# Patient Record
Sex: FEMALE | Race: BLACK OR AFRICAN AMERICAN | NOT HISPANIC OR LATINO | URBAN - METROPOLITAN AREA
[De-identification: names, ages, dates, MRNs, and addresses within clinical notes are randomized per-mention and may not be internally consistent; named-entity substitution may affect disease eponyms.]

---

## 2019-07-16 ENCOUNTER — EMERGENCY (EMERGENCY)
Facility: HOSPITAL | Age: 54
LOS: 1 days | Discharge: HOME | End: 2019-07-16
Attending: EMERGENCY MEDICINE | Admitting: EMERGENCY MEDICINE
Payer: COMMERCIAL

## 2019-07-16 VITALS
TEMPERATURE: 98 F | OXYGEN SATURATION: 97 % | RESPIRATION RATE: 18 BRPM | DIASTOLIC BLOOD PRESSURE: 90 MMHG | SYSTOLIC BLOOD PRESSURE: 137 MMHG | HEART RATE: 76 BPM

## 2019-07-16 VITALS
RESPIRATION RATE: 16 BRPM | DIASTOLIC BLOOD PRESSURE: 88 MMHG | HEART RATE: 95 BPM | TEMPERATURE: 98 F | OXYGEN SATURATION: 95 % | SYSTOLIC BLOOD PRESSURE: 134 MMHG

## 2019-07-16 LAB
ALBUMIN SERPL ELPH-MCNC: 4.4 G/DL — SIGNIFICANT CHANGE UP (ref 3.3–5)
ALP SERPL-CCNC: 81 U/L — SIGNIFICANT CHANGE UP (ref 40–120)
ALT FLD-CCNC: 17 U/L — SIGNIFICANT CHANGE UP (ref 4–33)
ANION GAP SERPL CALC-SCNC: 13 MMO/L — SIGNIFICANT CHANGE UP (ref 7–14)
AST SERPL-CCNC: 17 U/L — SIGNIFICANT CHANGE UP (ref 4–32)
BASOPHILS # BLD AUTO: 0.02 K/UL — SIGNIFICANT CHANGE UP (ref 0–0.2)
BASOPHILS NFR BLD AUTO: 0.3 % — SIGNIFICANT CHANGE UP (ref 0–2)
BILIRUB SERPL-MCNC: 0.4 MG/DL — SIGNIFICANT CHANGE UP (ref 0.2–1.2)
BUN SERPL-MCNC: 14 MG/DL — SIGNIFICANT CHANGE UP (ref 7–23)
CALCIUM SERPL-MCNC: 10 MG/DL — SIGNIFICANT CHANGE UP (ref 8.4–10.5)
CHLORIDE SERPL-SCNC: 106 MMOL/L — SIGNIFICANT CHANGE UP (ref 98–107)
CO2 SERPL-SCNC: 23 MMOL/L — SIGNIFICANT CHANGE UP (ref 22–31)
CREAT SERPL-MCNC: 0.65 MG/DL — SIGNIFICANT CHANGE UP (ref 0.5–1.3)
EOSINOPHIL # BLD AUTO: 0.09 K/UL — SIGNIFICANT CHANGE UP (ref 0–0.5)
EOSINOPHIL NFR BLD AUTO: 1.3 % — SIGNIFICANT CHANGE UP (ref 0–6)
GLUCOSE SERPL-MCNC: 150 MG/DL — HIGH (ref 70–99)
HCT VFR BLD CALC: 44.3 % — SIGNIFICANT CHANGE UP (ref 34.5–45)
HGB BLD-MCNC: 14.7 G/DL — SIGNIFICANT CHANGE UP (ref 11.5–15.5)
IMM GRANULOCYTES NFR BLD AUTO: 0.3 % — SIGNIFICANT CHANGE UP (ref 0–1.5)
LYMPHOCYTES # BLD AUTO: 1.71 K/UL — SIGNIFICANT CHANGE UP (ref 1–3.3)
LYMPHOCYTES # BLD AUTO: 24.5 % — SIGNIFICANT CHANGE UP (ref 13–44)
MCHC RBC-ENTMCNC: 29.2 PG — SIGNIFICANT CHANGE UP (ref 27–34)
MCHC RBC-ENTMCNC: 33.2 % — SIGNIFICANT CHANGE UP (ref 32–36)
MCV RBC AUTO: 88.1 FL — SIGNIFICANT CHANGE UP (ref 80–100)
MONOCYTES # BLD AUTO: 0.42 K/UL — SIGNIFICANT CHANGE UP (ref 0–0.9)
MONOCYTES NFR BLD AUTO: 6 % — SIGNIFICANT CHANGE UP (ref 2–14)
NEUTROPHILS # BLD AUTO: 4.72 K/UL — SIGNIFICANT CHANGE UP (ref 1.8–7.4)
NEUTROPHILS NFR BLD AUTO: 67.6 % — SIGNIFICANT CHANGE UP (ref 43–77)
NRBC # FLD: 0 K/UL — SIGNIFICANT CHANGE UP (ref 0–0)
PLATELET # BLD AUTO: 236 K/UL — SIGNIFICANT CHANGE UP (ref 150–400)
PMV BLD: 10.7 FL — SIGNIFICANT CHANGE UP (ref 7–13)
POTASSIUM SERPL-MCNC: 4.2 MMOL/L — SIGNIFICANT CHANGE UP (ref 3.5–5.3)
POTASSIUM SERPL-SCNC: 4.2 MMOL/L — SIGNIFICANT CHANGE UP (ref 3.5–5.3)
PROT SERPL-MCNC: 7.6 G/DL — SIGNIFICANT CHANGE UP (ref 6–8.3)
RBC # BLD: 5.03 M/UL — SIGNIFICANT CHANGE UP (ref 3.8–5.2)
RBC # FLD: 12.1 % — SIGNIFICANT CHANGE UP (ref 10.3–14.5)
SODIUM SERPL-SCNC: 142 MMOL/L — SIGNIFICANT CHANGE UP (ref 135–145)
TROPONIN T, HIGH SENSITIVITY: < 6 NG/L — SIGNIFICANT CHANGE UP (ref ?–14)
WBC # BLD: 6.98 K/UL — SIGNIFICANT CHANGE UP (ref 3.8–10.5)
WBC # FLD AUTO: 6.98 K/UL — SIGNIFICANT CHANGE UP (ref 3.8–10.5)

## 2019-07-16 PROCEDURE — 99284 EMERGENCY DEPT VISIT MOD MDM: CPT

## 2019-07-16 PROCEDURE — 70450 CT HEAD/BRAIN W/O DYE: CPT | Mod: 26

## 2019-07-16 RX ORDER — DIAZEPAM 5 MG
5 TABLET ORAL ONCE
Refills: 0 | Status: DISCONTINUED | OUTPATIENT
Start: 2019-07-16 | End: 2019-07-16

## 2019-07-16 RX ORDER — SODIUM CHLORIDE 9 MG/ML
1000 INJECTION INTRAMUSCULAR; INTRAVENOUS; SUBCUTANEOUS ONCE
Refills: 0 | Status: COMPLETED | OUTPATIENT
Start: 2019-07-16 | End: 2019-07-16

## 2019-07-16 RX ORDER — MECLIZINE HCL 12.5 MG
1 TABLET ORAL
Qty: 21 | Refills: 0
Start: 2019-07-16 | End: 2019-07-22

## 2019-07-16 RX ORDER — MECLIZINE HCL 12.5 MG
25 TABLET ORAL ONCE
Refills: 0 | Status: COMPLETED | OUTPATIENT
Start: 2019-07-16 | End: 2019-07-16

## 2019-07-16 RX ORDER — METOCLOPRAMIDE HCL 10 MG
10 TABLET ORAL ONCE
Refills: 0 | Status: COMPLETED | OUTPATIENT
Start: 2019-07-16 | End: 2019-07-16

## 2019-07-16 RX ADMIN — Medication 10 MILLIGRAM(S): at 21:33

## 2019-07-16 RX ADMIN — Medication 25 MILLIGRAM(S): at 19:51

## 2019-07-16 RX ADMIN — Medication 5 MILLIGRAM(S): at 21:34

## 2019-07-16 RX ADMIN — SODIUM CHLORIDE 2000 MILLILITER(S): 9 INJECTION INTRAMUSCULAR; INTRAVENOUS; SUBCUTANEOUS at 19:51

## 2019-07-16 NOTE — ED PROVIDER NOTE - OBJECTIVE STATEMENT
Pt is a 55yo woman with pmhx of htn, hld, and dm who presents to the ED complaining of dizziness. At around 1700 today pt was driving home from work when she suddenly felt felt both lightheaded and as if she was spinning. She pulled her car to the side of the road because she thought she was going to pass out, and she felt nauseous and vomited x1. Currently, her nausea has subsided but she still feels lightheaded and as if the room is spinning and states feels unsteady on her feet. She denies fever, CP, SOB, palpitations, prior episodes of near syncope, paresthesias, vision changes, or any other complaints.

## 2019-07-16 NOTE — ED PROVIDER NOTE - PROGRESS NOTE DETAILS
PA Berry: Pt reassessed - feeling much better and not dizzy any more. able to ambulate without difficult.

## 2019-07-16 NOTE — ED PROVIDER NOTE - CLINICAL SUMMARY MEDICAL DECISION MAKING FREE TEXT BOX
A: 54 female hx htn hld dm c/o dizziness/lightheaded   -vertiginous vs cardiac vs neurologic in nature  -EKG, labs, trop, meclizine, IVF

## 2019-07-16 NOTE — ED PROVIDER NOTE - RAPID ASSESSMENT
18:30 Imani:  I briefly saw pt  in [facility] to evaluate for stroke. Pt is 52F with pmh of HTN and DM p/w acute onset of dizzinesss a/w nausea while driving. Pt was ambulatory on the scene, no dysarthria or focal numbness/weakness. Currently  EOMI, PERRLA, no nystagmus, 5/5 strength and normal sensation in all extremities, normal finger to nose. Mildly unsteady on tandem gait. Likely peripheral vertigo, no focal deficits on exam other than mild ataxia on tandem gait.  Code stroke not activate.

## 2019-07-16 NOTE — ED PROVIDER NOTE - ATTENDING CONTRIBUTION TO CARE
Patient presents with dizziness that started while she was driving earlier today. Notes spinning/sensation of movement, worse with any positional change, better when staying still. No associated ha, vision changes, confusion, weakness, numbness. States she feels unsteady when walking 2/2 dizziness but is able to walk steadily when she tries.

## 2019-07-16 NOTE — ED PROVIDER NOTE - EYES NEGATIVE STATEMENT, MLM
no discharge, no irritation, no pain, and no visual changes. L eye chronically erythematous as per pt.

## 2019-09-07 ENCOUNTER — EMERGENCY (EMERGENCY)
Facility: HOSPITAL | Age: 54
LOS: 0 days | Discharge: HOME | End: 2019-09-07
Attending: EMERGENCY MEDICINE
Payer: COMMERCIAL

## 2019-09-07 VITALS
DIASTOLIC BLOOD PRESSURE: 70 MMHG | RESPIRATION RATE: 18 BRPM | WEIGHT: 139.99 LBS | SYSTOLIC BLOOD PRESSURE: 127 MMHG | OXYGEN SATURATION: 98 % | HEART RATE: 104 BPM | TEMPERATURE: 98 F

## 2019-09-07 VITALS
OXYGEN SATURATION: 99 % | SYSTOLIC BLOOD PRESSURE: 115 MMHG | HEART RATE: 91 BPM | DIASTOLIC BLOOD PRESSURE: 57 MMHG | RESPIRATION RATE: 15 BRPM

## 2019-09-07 DIAGNOSIS — R52 PAIN, UNSPECIFIED: ICD-10-CM

## 2019-09-07 DIAGNOSIS — R07.9 CHEST PAIN, UNSPECIFIED: ICD-10-CM

## 2019-09-07 DIAGNOSIS — E78.5 HYPERLIPIDEMIA, UNSPECIFIED: ICD-10-CM

## 2019-09-07 DIAGNOSIS — Z79.82 LONG TERM (CURRENT) USE OF ASPIRIN: ICD-10-CM

## 2019-09-07 DIAGNOSIS — I10 ESSENTIAL (PRIMARY) HYPERTENSION: ICD-10-CM

## 2019-09-07 DIAGNOSIS — R51 HEADACHE: ICD-10-CM

## 2019-09-07 DIAGNOSIS — K21.9 GASTRO-ESOPHAGEAL REFLUX DISEASE WITHOUT ESOPHAGITIS: ICD-10-CM

## 2019-09-07 DIAGNOSIS — F32.9 MAJOR DEPRESSIVE DISORDER, SINGLE EPISODE, UNSPECIFIED: ICD-10-CM

## 2019-09-07 LAB
ALBUMIN SERPL ELPH-MCNC: 3.9 G/DL — SIGNIFICANT CHANGE UP (ref 3.3–5)
ALP SERPL-CCNC: 96 U/L — SIGNIFICANT CHANGE UP (ref 40–120)
ALT FLD-CCNC: 24 U/L — SIGNIFICANT CHANGE UP (ref 12–78)
AMYLASE P1 CFR SERPL: 56 U/L — SIGNIFICANT CHANGE UP (ref 25–115)
ANION GAP SERPL CALC-SCNC: 7 MMOL/L — SIGNIFICANT CHANGE UP (ref 5–17)
APTT BLD: 32.1 SEC — SIGNIFICANT CHANGE UP (ref 27.5–36.3)
AST SERPL-CCNC: 23 U/L — SIGNIFICANT CHANGE UP (ref 15–37)
BASOPHILS # BLD AUTO: 0.01 K/UL — SIGNIFICANT CHANGE UP (ref 0–0.2)
BASOPHILS NFR BLD AUTO: 0.2 % — SIGNIFICANT CHANGE UP (ref 0–2)
BILIRUB SERPL-MCNC: 0.5 MG/DL — SIGNIFICANT CHANGE UP (ref 0.2–1.2)
BUN SERPL-MCNC: 19 MG/DL — SIGNIFICANT CHANGE UP (ref 7–23)
CALCIUM SERPL-MCNC: 8.8 MG/DL — SIGNIFICANT CHANGE UP (ref 8.5–10.1)
CHLORIDE SERPL-SCNC: 107 MMOL/L — SIGNIFICANT CHANGE UP (ref 96–108)
CK MB BLD-MCNC: 1.1 % — SIGNIFICANT CHANGE UP (ref 0–3.5)
CK MB CFR SERPL CALC: 1.3 NG/ML — SIGNIFICANT CHANGE UP (ref 0.5–3.6)
CK SERPL-CCNC: 116 U/L — SIGNIFICANT CHANGE UP (ref 26–192)
CO2 SERPL-SCNC: 26 MMOL/L — SIGNIFICANT CHANGE UP (ref 22–31)
CREAT SERPL-MCNC: 0.93 MG/DL — SIGNIFICANT CHANGE UP (ref 0.5–1.3)
D DIMER BLD IA.RAPID-MCNC: <150 NG/ML DDU — SIGNIFICANT CHANGE UP
EOSINOPHIL # BLD AUTO: 0.14 K/UL — SIGNIFICANT CHANGE UP (ref 0–0.5)
EOSINOPHIL NFR BLD AUTO: 2.9 % — SIGNIFICANT CHANGE UP (ref 0–6)
GLUCOSE SERPL-MCNC: 168 MG/DL — HIGH (ref 70–99)
HCG SERPL-ACNC: 1 MIU/ML — SIGNIFICANT CHANGE UP
HCT VFR BLD CALC: 43.7 % — SIGNIFICANT CHANGE UP (ref 34.5–45)
HGB BLD-MCNC: 14.6 G/DL — SIGNIFICANT CHANGE UP (ref 11.5–15.5)
IMM GRANULOCYTES NFR BLD AUTO: 0.2 % — SIGNIFICANT CHANGE UP (ref 0–1.5)
INR BLD: 0.99 RATIO — SIGNIFICANT CHANGE UP (ref 0.88–1.16)
LIDOCAIN IGE QN: 187 U/L — SIGNIFICANT CHANGE UP (ref 73–393)
LYMPHOCYTES # BLD AUTO: 2.08 K/UL — SIGNIFICANT CHANGE UP (ref 1–3.3)
LYMPHOCYTES # BLD AUTO: 43.4 % — SIGNIFICANT CHANGE UP (ref 13–44)
MCHC RBC-ENTMCNC: 29.5 PG — SIGNIFICANT CHANGE UP (ref 27–34)
MCHC RBC-ENTMCNC: 33.4 GM/DL — SIGNIFICANT CHANGE UP (ref 32–36)
MCV RBC AUTO: 88.3 FL — SIGNIFICANT CHANGE UP (ref 80–100)
MONOCYTES # BLD AUTO: 0.34 K/UL — SIGNIFICANT CHANGE UP (ref 0–0.9)
MONOCYTES NFR BLD AUTO: 7.1 % — SIGNIFICANT CHANGE UP (ref 2–14)
NEUTROPHILS # BLD AUTO: 2.21 K/UL — SIGNIFICANT CHANGE UP (ref 1.8–7.4)
NEUTROPHILS NFR BLD AUTO: 46.2 % — SIGNIFICANT CHANGE UP (ref 43–77)
NRBC # BLD: 0 /100 WBCS — SIGNIFICANT CHANGE UP (ref 0–0)
PLATELET # BLD AUTO: 241 K/UL — SIGNIFICANT CHANGE UP (ref 150–400)
POTASSIUM SERPL-MCNC: 4.2 MMOL/L — SIGNIFICANT CHANGE UP (ref 3.5–5.3)
POTASSIUM SERPL-SCNC: 4.2 MMOL/L — SIGNIFICANT CHANGE UP (ref 3.5–5.3)
PROT SERPL-MCNC: 7.8 GM/DL — SIGNIFICANT CHANGE UP (ref 6–8.3)
PROTHROM AB SERPL-ACNC: 11.1 SEC — SIGNIFICANT CHANGE UP (ref 10–12.9)
RBC # BLD: 4.95 M/UL — SIGNIFICANT CHANGE UP (ref 3.8–5.2)
RBC # FLD: 11.9 % — SIGNIFICANT CHANGE UP (ref 10.3–14.5)
SODIUM SERPL-SCNC: 140 MMOL/L — SIGNIFICANT CHANGE UP (ref 135–145)
TROPONIN I SERPL-MCNC: <.015 NG/ML — SIGNIFICANT CHANGE UP (ref 0.01–0.04)
TROPONIN I SERPL-MCNC: <.015 NG/ML — SIGNIFICANT CHANGE UP (ref 0.01–0.04)
WBC # BLD: 4.79 K/UL — SIGNIFICANT CHANGE UP (ref 3.8–10.5)
WBC # FLD AUTO: 4.79 K/UL — SIGNIFICANT CHANGE UP (ref 3.8–10.5)

## 2019-09-07 PROCEDURE — 71045 X-RAY EXAM CHEST 1 VIEW: CPT | Mod: 26

## 2019-09-07 PROCEDURE — 70450 CT HEAD/BRAIN W/O DYE: CPT | Mod: 26

## 2019-09-07 PROCEDURE — 99285 EMERGENCY DEPT VISIT HI MDM: CPT

## 2019-09-07 PROCEDURE — 93010 ELECTROCARDIOGRAM REPORT: CPT

## 2019-09-07 PROCEDURE — 71275 CT ANGIOGRAPHY CHEST: CPT | Mod: 26

## 2019-09-07 RX ORDER — PANTOPRAZOLE SODIUM 20 MG/1
40 TABLET, DELAYED RELEASE ORAL ONCE
Refills: 0 | Status: COMPLETED | OUTPATIENT
Start: 2019-09-07 | End: 2019-09-07

## 2019-09-07 RX ADMIN — PANTOPRAZOLE SODIUM 40 MILLIGRAM(S): 20 TABLET, DELAYED RELEASE ORAL at 17:44

## 2019-09-07 RX ADMIN — Medication 30 MILLILITER(S): at 17:44

## 2019-09-07 NOTE — ED PROVIDER NOTE - PATIENT PORTAL LINK FT
You can access the FollowMyHealth Patient Portal offered by [facility] by registering at the following website: [email or web address]. By joining [facility]’s FollowMyHealth portal, you will also be able to view your health information using other applications (apps) compatible with our system.

## 2019-09-07 NOTE — ED PROVIDER NOTE - CONSTITUTIONAL, MLM
normal... Well appearing, well nourished, awake, alert, oriented to person, place, time/situation and in no apparent distress. Speaking in clear full sentences no nasal flaring no shoulders retractions no diaphoresis, not holding her head/chest/abdomen, smiling appears very comfortable

## 2019-09-07 NOTE — ED PROVIDER NOTE - OBJECTIVE STATEMENT
54 years old female here c/o intermittent headache. right lower chest pain, body aches and palpitations for two weeks. Pt denies headache, dizziness, recent trauma, long traveling, blurred visions, light sensitivities, focal/distal weakness or numbness, cough, sob, chest pain, calf/legs pain, vomiting, fever, chills,  dysuria or irregular bowel movements.

## 2019-09-07 NOTE — ED PROVIDER NOTE - NONTENDER LOCATION
left lower quadrant/right lower quadrant/left costovertebral angle/right costovertebral angle/left upper quadrant/umbilical/suprapubic/right upper quadrant/periumbilical

## 2019-09-07 NOTE — ED ADULT TRIAGE NOTE - MODE OF ARRIVAL
Health Maintenance Due   Topic Date Due   • Depression Screening  05/08/1989   • Pneumococcal 19-64 Medium Risk (1 of 1 - PPSV23) 05/08/1996   • Influenza Vaccine (1) 09/01/2018       Patient is due for topics as listed above and wishes to proceed with the depression screening, but declines Immunization(s) Influenza and Pneumococcal at this time.             Walk in

## 2020-02-26 NOTE — ED ADULT NURSE NOTE - PAIN: PRECIPITATING/AGGRAVATING FACTORS
Vulvar inclusion cyst present in area of concern  Reviewed natural hx and benign nature of this  Expectant management recommended   All questions were answered to the patient's satisfaction movement

## 2023-02-23 NOTE — ED PROVIDER NOTE - CPE EDP CARDIAC NORM
[FreeTextEntry1] : HFpEF she is euvolemic on farxiga  mg daily and torsemide 10 mg daily aldactone 25 mg daily and edarbi 80 mg daily \par she will take antibiotics one hour prior to extraction and will NOT hold eliquis she can hold aspirin \par HTN controlled on current regimen \par rising creatinine I reassured her that \par She should be on aspirin for her valve\par afib on eliquis and diltiazem 360 mg daily metoprolol 50 mg daily \par fu in three months \par 
- - -

## 2023-04-04 NOTE — ED PROVIDER NOTE - TEMPLATE, MLM
General Azithromycin Pregnancy And Lactation Text: This medication is considered safe during pregnancy and is also secreted in breast milk.

## 2023-06-05 ENCOUNTER — APPOINTMENT (OUTPATIENT)
Dept: ORTHOPEDIC SURGERY | Facility: CLINIC | Age: 58
End: 2023-06-05
Payer: COMMERCIAL

## 2023-06-05 VITALS — BODY MASS INDEX: 24.8 KG/M2 | WEIGHT: 140 LBS | HEIGHT: 63 IN

## 2023-06-05 DIAGNOSIS — S83.242A OTHER TEAR OF MEDIAL MENISCUS, CURRENT INJURY, LEFT KNEE, INITIAL ENCOUNTER: ICD-10-CM

## 2023-06-05 PROCEDURE — 73562 X-RAY EXAM OF KNEE 3: CPT | Mod: 50

## 2023-06-05 PROCEDURE — 73502 X-RAY EXAM HIP UNI 2-3 VIEWS: CPT

## 2023-06-05 PROCEDURE — 99203 OFFICE O/P NEW LOW 30 MIN: CPT

## 2023-06-05 NOTE — HISTORY OF PRESENT ILLNESS
[10] : 10 [9] : 9 [Burning] : burning [Radiating] : radiating [Nothing helps with pain getting better] : Nothing helps with pain getting better [Sitting] : sitting [Standing] : standing [Walking] : walking [de-identified] : 6/5/23:  Pt has had b/l hip pain for about 2 years.  Pt has had pain in left knee for about a month.  Denies trauma. [] : no [de-identified] : outside provider

## 2023-06-05 NOTE — ASSESSMENT
[FreeTextEntry1] : The patient was advised of the diagnosis. The natural history of the pathology was explained in full to the patient in layman's terms. All questions were answered. The risks and benefits of surgical and non-surgical treatment alternatives were explained in full to the patient.\par \par Pt was referred to Dr Navarro

## 2023-06-05 NOTE — IMAGING
[de-identified] : right hip:\par no swelling/ecchymosis\par nvid\par no internal or external rotation\par \par left hip:\par no swelling/ecchymosis\par nvid\par stiffness in internal rotation\par \par left knee:\par no swelling/ecchymosis\par nvid\par positive McMurrays [Bilateral] : hip bilaterally [Left] : left knee [There are no fractures, subluxations or dislocations. No significant abnormalities are seen] : There are no fractures, subluxations or dislocations. No significant abnormalities are seen [FreeTextEntry9] : right hip OA

## 2023-07-11 ENCOUNTER — APPOINTMENT (OUTPATIENT)
Dept: ORTHOPEDIC SURGERY | Facility: CLINIC | Age: 58
End: 2023-07-11
Payer: COMMERCIAL

## 2023-07-11 VITALS — BODY MASS INDEX: 24.8 KG/M2 | WEIGHT: 140 LBS | HEIGHT: 63 IN

## 2023-07-11 DIAGNOSIS — M17.12 UNILATERAL PRIMARY OSTEOARTHRITIS, LEFT KNEE: ICD-10-CM

## 2023-07-11 PROCEDURE — 99215 OFFICE O/P EST HI 40 MIN: CPT

## 2023-07-11 NOTE — ASSESSMENT
[FreeTextEntry1] : 57 y/o F with severe R hip OA and bilateral knee PF OA\par \par CSI R hip with Dr. Campos\par knee pain likely referred\par Discussed eventual need for DEBORA, surgery discussed in detail\par

## 2023-07-11 NOTE — IMAGING
[de-identified] : LOWER EXTREMITY/ RIGHT HIP EXAM\par Standing pelvic alignment: Symmetric with no Trendelenburg\par Atrophy: none\par Ecchymosis/swelling: none\par \par Range of Motion\par Hip: Flexion/extension/ER/IR     / EX 20/ ER 10 / IR 0 / AB 60 /AD 20\par Impingement with flexion adduction and internal rotation: POS\par Contracture: none\par Snapping hip: negative\par Greater trochanter: no tenderness\par \par Neurovascular\par Distal extremities: warm to touch\par Sensation to light touch: intact\par Muscle strength: 5/5\par \par Back\par Alignment: normal\par Spinous process: no tenderness\par Paraspinal tenderness: No tenderness\par Range of motion: full and pain free\par Scoliosis: none\par Straight leg sign: negative\par \par IMAGING:\par 07/11/2023 Xrays of the Right hip 3v were taken demonstrating tonnis grade 3 Osteoarthritis with joint space collapse, sclerosis, osteophytes, and subchondral cysts. Bilateral knees with PF OA\par \par

## 2023-07-11 NOTE — HISTORY OF PRESENT ILLNESS
[10] : 10 [9] : 9 [Burning] : burning [Radiating] : radiating [Nothing helps with pain getting better] : Nothing helps with pain getting better [Sitting] : sitting [Standing] : standing [Walking] : walking [de-identified] : 07/11/2023 Ms. SAM SERRANO is a 58 year female that comes in today with a chief complaint of right hip pain X 2 years. Has groin pain, difficulty with ROM, cannot put her socks on. Has not had any injections. Also with R knee pain X 2 years.  [] : no [de-identified] : outside provider

## 2023-08-14 ENCOUNTER — APPOINTMENT (OUTPATIENT)
Dept: PAIN MANAGEMENT | Facility: CLINIC | Age: 58
End: 2023-08-14
Payer: COMMERCIAL

## 2023-08-14 PROCEDURE — 27093 INJECTION FOR HIP X-RAY: CPT | Mod: RT

## 2023-08-14 PROCEDURE — J3490M: CUSTOM

## 2023-08-14 PROCEDURE — 73525 CONTRAST X-RAY OF HIP: CPT | Mod: 26,59

## 2023-08-14 PROCEDURE — 82948 REAGENT STRIP/BLOOD GLUCOSE: CPT

## 2023-08-14 NOTE — PROCEDURE
[FreeTextEntry3] : Date of Service: 08/14/2023   Account: [id number]  Patient: SAM SERRANO   YOB: 1965  Age: 58 year   Surgeon: Manuela Campos M.D.  Pre-Operative Diagnosis: Unilateral Primary Osteoarthritis , Right Hip (M16.11)  Post Operative Diagnosis: Unilateral Primary Osteoarthritis , Right Hip (M16.11)  Procedure: Right Hip arthrogram and steroid injection under fluoroscopic  guidance                                                           This procedure was carried out using fluoroscopic guidance.  The risks and benefits of the procedure were discussed extensively with the patient.  The consent of the patient was obtained and the following procedure was performed.  The patient was placed in the supine position with right hip flexed and externally rotated 25 degrees.  The area of the right groin was prepped and draped in a sterile fashion.  The fluoroscopic image intensifier was then positioned so that the right hip appeared in view, and the midline intertrochanteric region was identified and marked. The skin and subcutaneous structures were then anesthetized using 1 cc of 1% lidocaine.  A 22 gauge spinal needle was then inserted and directed into this right hip intra-capsular region.  After negative aspiration for heme,  3 cc of Omnipaque was injected and appeared to fill the joint  margins.   Right hip arthrogram showed no intravascular flow, and good spread around the femoral head and to the acetabulum. An injectate of 3cc 0.25% marcaine plus 80 mg of Kenalog was then injected into the right hip space.  The needle was then removed and pressure was applied.  The patient was instructed to apply ice over the injection site for twenty minutes every two hours for the next 24 to 48 hours.  The patient was also instructed to contact me immediately if there were any problems.  Manuela Campos M.D.

## 2023-10-12 ENCOUNTER — APPOINTMENT (OUTPATIENT)
Dept: PAIN MANAGEMENT | Facility: CLINIC | Age: 58
End: 2023-10-12

## 2023-11-13 ENCOUNTER — APPOINTMENT (OUTPATIENT)
Dept: PAIN MANAGEMENT | Facility: CLINIC | Age: 58
End: 2023-11-13
Payer: COMMERCIAL

## 2023-11-13 VITALS — BODY MASS INDEX: 24.63 KG/M2 | WEIGHT: 139 LBS | HEIGHT: 63 IN

## 2023-11-13 PROCEDURE — 99214 OFFICE O/P EST MOD 30 MIN: CPT

## 2023-12-22 ENCOUNTER — APPOINTMENT (OUTPATIENT)
Age: 58
End: 2023-12-22

## 2024-01-08 ENCOUNTER — APPOINTMENT (OUTPATIENT)
Dept: PAIN MANAGEMENT | Facility: CLINIC | Age: 59
End: 2024-01-08
Payer: COMMERCIAL

## 2024-01-08 VITALS — BODY MASS INDEX: 25.52 KG/M2 | HEIGHT: 63 IN | WEIGHT: 144 LBS

## 2024-01-08 PROCEDURE — 99214 OFFICE O/P EST MOD 30 MIN: CPT

## 2024-01-08 NOTE — HISTORY OF PRESENT ILLNESS
[Dull/Aching] : dull/aching [Intermittent] : intermittent [Injection therapy] : injection therapy [Sitting] : sitting [Standing] : standing [7] : 7 [Burning] : burning [Sharp] : sharp [] : no [FreeTextEntry1] : rt hip  [FreeTextEntry7] : B/L LEGS  [de-identified] : long period time  [de-identified] : xray

## 2024-01-22 ENCOUNTER — APPOINTMENT (OUTPATIENT)
Dept: PAIN MANAGEMENT | Facility: CLINIC | Age: 59
End: 2024-01-22
Payer: COMMERCIAL

## 2024-01-22 DIAGNOSIS — M16.11 UNILATERAL PRIMARY OSTEOARTHRITIS, RIGHT HIP: ICD-10-CM

## 2024-01-22 PROCEDURE — 82948 REAGENT STRIP/BLOOD GLUCOSE: CPT

## 2024-01-22 PROCEDURE — J3490M: CUSTOM

## 2024-01-22 PROCEDURE — 27093 INJECTION FOR HIP X-RAY: CPT | Mod: RT

## 2024-01-22 PROCEDURE — 73525 CONTRAST X-RAY OF HIP: CPT | Mod: 26,59

## 2024-01-22 NOTE — PROCEDURE
[FreeTextEntry3] : Date of Service: 01/22/2024   Account: [id number]  Patient: SAM SERRANO   YOB: 1965  Age: 58 year   Surgeon: Manuela Campos M.D.  Pre-Operative Diagnosis: Unilateral Primary Osteoarthritis , Right Hip (M16.11)  Post Operative Diagnosis: Unilateral Primary Osteoarthritis , Right Hip (M16.11)  Procedure: Right Hip arthrogram and steroid injection under fluoroscopic  guidance                                                           This procedure was carried out using fluoroscopic guidance.  The risks and benefits of the procedure were discussed extensively with the patient.  The consent of the patient was obtained and the following procedure was performed.  The patient was placed in the supine position with right hip flexed and externally rotated 25 degrees.  The area of the right groin was prepped and draped in a sterile fashion.  The fluoroscopic image intensifier was then positioned so that the right hip appeared in view, and the midline intertrochanteric region was identified and marked. The skin and subcutaneous structures were then anesthetized using 1 cc of 1% lidocaine.  A 22 gauge spinal needle was then inserted and directed into this right hip intra-capsular region.  After negative aspiration for heme,  3 cc of Omnipaque was injected and appeared to fill the joint  margins.   Right hip arthrogram showed no intravascular flow, and good spread around the femoral head and to the acetabulum. An injectate of 3cc 0.25% marcaine plus 80 mg of Kenalog was then injected into the right hip space.  The needle was then removed and pressure was applied.The patient was instructed to apply ice over the injection site for twenty minutes every two hours for the next 24 to 48 hours.  The patient was also instructed to contact me immediately if there were any problems.  Manuela Campos M.D.

## 2024-01-26 ENCOUNTER — APPOINTMENT (OUTPATIENT)
Age: 59
End: 2024-01-26

## 2024-02-07 ENCOUNTER — APPOINTMENT (OUTPATIENT)
Dept: PAIN MANAGEMENT | Facility: CLINIC | Age: 59
End: 2024-02-07

## 2024-06-19 ENCOUNTER — APPOINTMENT (OUTPATIENT)
Dept: PAIN MANAGEMENT | Facility: CLINIC | Age: 59
End: 2024-06-19
Payer: COMMERCIAL

## 2024-06-19 VITALS — HEIGHT: 63 IN | BODY MASS INDEX: 25.16 KG/M2 | WEIGHT: 142 LBS

## 2024-06-19 DIAGNOSIS — M17.11 UNILATERAL PRIMARY OSTEOARTHRITIS, RIGHT KNEE: ICD-10-CM

## 2024-06-19 DIAGNOSIS — M54.16 RADICULOPATHY, LUMBAR REGION: ICD-10-CM

## 2024-06-19 PROCEDURE — 99214 OFFICE O/P EST MOD 30 MIN: CPT

## 2024-06-19 NOTE — HISTORY OF PRESENT ILLNESS
[7] : 7 [Burning] : burning [Dull/Aching] : dull/aching [Sharp] : sharp [Intermittent] : intermittent [Injection therapy] : injection therapy [Sitting] : sitting [Standing] : standing [9] : 9 [Radiating] : radiating [Household chores] : household chores [Leisure] : leisure [] : no [FreeTextEntry1] : rt hip  [FreeTextEntry7] : B/L LEGS  [de-identified] : long period time  [de-identified] : xray

## 2024-06-20 PROBLEM — M54.16 LUMBAR RADICULOPATHY, CHRONIC: Status: ACTIVE | Noted: 2024-06-20

## 2024-06-25 ENCOUNTER — APPOINTMENT (OUTPATIENT)
Dept: MRI IMAGING | Facility: CLINIC | Age: 59
End: 2024-06-25
Payer: COMMERCIAL

## 2024-06-25 PROCEDURE — 72148 MRI LUMBAR SPINE W/O DYE: CPT

## 2024-07-03 ENCOUNTER — APPOINTMENT (OUTPATIENT)
Dept: PAIN MANAGEMENT | Facility: CLINIC | Age: 59
End: 2024-07-03
Payer: COMMERCIAL

## 2024-07-03 VITALS — BODY MASS INDEX: 25.16 KG/M2 | HEIGHT: 63 IN | WEIGHT: 142 LBS

## 2024-07-03 DIAGNOSIS — M54.16 RADICULOPATHY, LUMBAR REGION: ICD-10-CM

## 2024-07-03 PROCEDURE — 99214 OFFICE O/P EST MOD 30 MIN: CPT

## 2024-07-03 RX ORDER — GABAPENTIN 300 MG/1
300 CAPSULE ORAL
Qty: 30 | Refills: 0 | Status: ACTIVE | COMMUNITY
Start: 2024-07-03 | End: 1900-01-01

## 2024-07-19 ENCOUNTER — APPOINTMENT (OUTPATIENT)
Age: 59
End: 2024-07-19

## 2024-07-31 ENCOUNTER — APPOINTMENT (OUTPATIENT)
Dept: PAIN MANAGEMENT | Facility: CLINIC | Age: 59
End: 2024-07-31

## 2025-01-06 ENCOUNTER — RX RENEWAL (OUTPATIENT)
Age: 60
End: 2025-01-06

## 2025-04-10 NOTE — ED PROVIDER NOTE - ENMT, MLM
Airway patent, Nasal mucosa clear. Mouth with normal mucosa. Throat has no vesicles, no oropharyngeal exudates and uvula is midline.
Detail Level: Zone